# Patient Record
Sex: MALE | Race: BLACK OR AFRICAN AMERICAN | NOT HISPANIC OR LATINO | Employment: OTHER | RURAL
[De-identification: names, ages, dates, MRNs, and addresses within clinical notes are randomized per-mention and may not be internally consistent; named-entity substitution may affect disease eponyms.]

---

## 2022-09-01 ENCOUNTER — OFFICE VISIT (OUTPATIENT)
Dept: CARDIOLOGY | Facility: CLINIC | Age: 67
End: 2022-09-01
Payer: MEDICARE

## 2022-09-01 DIAGNOSIS — I10 HYPERTENSION, ESSENTIAL: Chronic | ICD-10-CM

## 2022-09-01 DIAGNOSIS — E11.9 DIABETES MELLITUS WITHOUT COMPLICATION: Chronic | ICD-10-CM

## 2022-09-01 DIAGNOSIS — G47.00 INSOMNIA, UNSPECIFIED TYPE: Chronic | ICD-10-CM

## 2022-09-01 DIAGNOSIS — R53.83 FATIGUE, UNSPECIFIED TYPE: ICD-10-CM

## 2022-09-01 DIAGNOSIS — R42 DIZZINESS: ICD-10-CM

## 2022-09-01 DIAGNOSIS — R55 SYNCOPE AND COLLAPSE: Primary | ICD-10-CM

## 2022-09-01 PROCEDURE — 99203 OFFICE O/P NEW LOW 30 MIN: CPT | Mod: S$PBB,,, | Performed by: INTERNAL MEDICINE

## 2022-09-01 PROCEDURE — 93010 ELECTROCARDIOGRAM REPORT: CPT | Mod: S$PBB,,, | Performed by: INTERNAL MEDICINE

## 2022-09-01 PROCEDURE — 93005 ELECTROCARDIOGRAM TRACING: CPT | Mod: PBBFAC | Performed by: INTERNAL MEDICINE

## 2022-09-01 PROCEDURE — 99203 PR OFFICE/OUTPT VISIT, NEW, LEVL III, 30-44 MIN: ICD-10-PCS | Mod: S$PBB,,, | Performed by: INTERNAL MEDICINE

## 2022-09-01 PROCEDURE — 99212 OFFICE O/P EST SF 10 MIN: CPT | Mod: PBBFAC | Performed by: INTERNAL MEDICINE

## 2022-09-01 PROCEDURE — 93010 EKG 12-LEAD: ICD-10-PCS | Mod: S$PBB,,, | Performed by: INTERNAL MEDICINE

## 2022-09-06 PROBLEM — R55 SYNCOPE AND COLLAPSE: Status: ACTIVE | Noted: 2022-09-06

## 2022-09-06 PROBLEM — I10 HYPERTENSION, ESSENTIAL: Chronic | Status: ACTIVE | Noted: 2022-09-06

## 2022-09-06 PROBLEM — E11.9 DIABETES MELLITUS WITHOUT COMPLICATION: Chronic | Status: ACTIVE | Noted: 2022-09-06

## 2022-09-06 PROBLEM — R53.83 FATIGUE: Status: ACTIVE | Noted: 2022-09-06

## 2022-09-06 PROBLEM — G47.00 INSOMNIA: Chronic | Status: ACTIVE | Noted: 2022-09-06

## 2022-09-06 PROBLEM — R42 DIZZINESS: Status: ACTIVE | Noted: 2022-09-06

## 2022-09-06 NOTE — PROGRESS NOTES
[facility] note        DATE OF SERVICE: 09/06/2022       PCP: Brianna Tabares MD      CHIEF COMPLAINT: No chief complaint on file.       HISTORY OF PRESENT ILLNESS:  David Snyder is a 67 y.o. male with a PMH of   Past Medical History:   Diagnosis Date    Fatigue     Syncope and collapse      who presents for No chief complaint on file.         Review of Systems: Review of Systems   Respiratory:  Positive for shortness of breath.    Cardiovascular:  Positive for leg swelling. Negative for chest pain and palpitations.   Neurological:  Positive for loss of consciousness.      PAST MEDICAL HISTORY:  Past Medical History:   Diagnosis Date    Fatigue     Syncope and collapse        PAST SURGICAL HISTORY:  No past surgical history on file.    SOCIAL HISTORY:  Social History     Socioeconomic History    Marital status:        FAMILY HISTORY:  No family history on file.      ALLERGIES:  Review of patient's allergies indicates:  Not on File     MEDICATIONS:  No current outpatient medications on file.     PHYSICAL EXAM:  There were no vitals taken for this visit.  Wt Readings from Last 3 Encounters:   No data found for Wt      There is no height or weight on file to calculate BMI.    Physical Exam  Vitals reviewed.   Constitutional:       Appearance: Normal appearance.   HENT:      Head: Normocephalic and atraumatic.   Neck:      Vascular: No carotid bruit or JVD.   Cardiovascular:      Rate and Rhythm: Normal rate and regular rhythm.      Pulses: Normal pulses.           Radial pulses are 2+ on the right side and 2+ on the left side.      Heart sounds: Normal heart sounds.   Pulmonary:      Effort: Pulmonary effort is normal.      Breath sounds: Normal breath sounds.   Musculoskeletal:      Right lower leg: No edema.      Left lower leg: No edema.   Skin:     General: Skin is warm and dry.   Neurological:      Mental Status: He is alert.     LABS REVIEWED:  LDL 76 on 6/14/22    CARDIAC STUDIES REVIEWED:EKG:  "sinus rhythm with 1st degree AV block, right axis deviation, 74 bpm        ASSESSMENT:  +insomnia with daytime somnolence.  Blackouts only last seconds.  No blackouts in a month.  "If I jump up real quick, I get dizzy." "I get tired real easy."    VISIT DIAGNOSIS:  Syncope and collapse  Comments:  with reported pauses (poor quality Holter report, copy/faxed)  Orders:  -     EKG 12-lead; Future  -     Echo; Future; Expected date: 10/01/2022  -     Holter monitor - 24 hour; Future    Insomnia, unspecified type    Diabetes mellitus without complication    Hypertension, essential    Fatigue, unspecified type    Dizziness       PLAN:  Sleep consult   Orders Placed This Encounter   Procedures    Holter monitor - 24 hour     Standing Status:   Future     Standing Expiration Date:   9/1/2023     Order Specific Question:   Release to patient     Answer:   Immediate    EKG 12-lead     Standing Status:   Future     Number of Occurrences:   1     Standing Expiration Date:   9/1/2023    Echo     Standing Status:   Future     Standing Expiration Date:   10/1/2022     Order Specific Question:   Color Doppler?     Answer:   Yes     Order Specific Question:   Release to patient     Answer:   Immediate      RTC after tests.       "

## 2022-09-13 ENCOUNTER — HOSPITAL ENCOUNTER (OUTPATIENT)
Dept: CARDIOLOGY | Facility: HOSPITAL | Age: 67
Discharge: HOME | End: 2022-09-13
Attending: INTERNAL MEDICINE
Payer: MEDICARE

## 2022-09-13 VITALS — BODY MASS INDEX: 38.51 KG/M2 | HEIGHT: 69 IN | WEIGHT: 260 LBS

## 2022-09-13 DIAGNOSIS — R55 SYNCOPE AND COLLAPSE: ICD-10-CM

## 2022-09-13 PROCEDURE — 93306 ECHO (CUPID ONLY): ICD-10-PCS | Mod: 26,,, | Performed by: INTERNAL MEDICINE

## 2022-09-13 PROCEDURE — 93306 TTE W/DOPPLER COMPLETE: CPT | Mod: 26,,, | Performed by: INTERNAL MEDICINE

## 2022-09-13 PROCEDURE — 93225 XTRNL ECG REC<48 HRS REC: CPT

## 2022-09-13 PROCEDURE — 93306 TTE W/DOPPLER COMPLETE: CPT

## 2022-09-16 LAB
AORTIC VALVE CUSP SEPERATION: 24.7 CM
AV INDEX (PROSTH): 0.63
AV MEAN GRADIENT: 9 MMHG
AV PEAK GRADIENT: 16 MMHG
AV VALVE AREA: 2.09 CM2
BSA FOR ECHO PROCEDURE: 2.4 M2
CV ECHO LV RWT: 0.66 CM
DOP CALC AO PEAK VEL: 2 M/S
DOP CALC AO VTI: 37.14 CM
DOP CALC LVOT AREA: 3.3 CM2
DOP CALC LVOT DIAMETER: 2.05 CM
DOP CALC LVOT STROKE VOLUME: 77.69 CM3
DOP CALCLVOT PEAK VEL VTI: 23.55 CM
E WAVE DECELERATION TIME: 154 MSEC
E/A RATIO: 1.33
E/E' RATIO: 8 M/S
ECHO LV POSTERIOR WALL: 1.25 CM (ref 0.6–1.1)
EJECTION FRACTION: 70 %
FRACTIONAL SHORTENING: 37 % (ref 28–44)
INTERVENTRICULAR SEPTUM: 1.24 CM (ref 0.6–1.1)
IVC DIAMETER: 1.4 CM
LEFT ATRIUM SIZE: 2.62 CM
LEFT INTERNAL DIMENSION IN SYSTOLE: 2.39 CM (ref 2.1–4)
LEFT VENTRICLE DIASTOLIC VOLUME INDEX: 26.32 ML/M2
LEFT VENTRICLE DIASTOLIC VOLUME: 60.8 ML
LEFT VENTRICLE MASS INDEX: 69 G/M2
LEFT VENTRICLE SYSTOLIC VOLUME INDEX: 8.7 ML/M2
LEFT VENTRICLE SYSTOLIC VOLUME: 20 ML
LEFT VENTRICULAR INTERNAL DIMENSION IN DIASTOLE: 3.77 CM (ref 3.5–6)
LEFT VENTRICULAR MASS: 160.12 G
LV LATERAL E/E' RATIO: 7.33 M/S
LV SEPTAL E/E' RATIO: 8.8 M/S
MV PEAK A VEL: 0.66 M/S
MV PEAK E VEL: 0.88 M/S
OHS CV EVENT MONITOR DAY: 0
OHS CV HOLTER LENGTH DECIMAL HOURS: 23.73
OHS CV HOLTER LENGTH HOURS: 23
OHS CV HOLTER LENGTH MINUTES: 44
OHS CV HOLTER SINUS AVERAGE HR: 72
OHS CV HOLTER SINUS MAX HR: 123
OHS CV HOLTER SINUS MIN HR: 39
RA WIDTH: 3.29 CM
RIGHT ATRIAL AREA: 14.9 CM2
RIGHT VENTRICULAR END-DIASTOLIC DIMENSION: 4.08 CM
RIGHT VENTRICULAR LENGTH IN DIASTOLE (APICAL 4-CHAMBER VIEW): 7.13 CM
RV MID DIAMA: 2.74 CM
TDI LATERAL: 0.12 M/S
TDI SEPTAL: 0.1 M/S
TDI: 0.11 M/S
TRICUSPID ANNULAR PLANE SYSTOLIC EXCURSION: 2.19 CM

## 2022-09-16 PROCEDURE — 93227 XTRNL ECG REC<48 HR R&I: CPT | Mod: ,,, | Performed by: INTERNAL MEDICINE

## 2022-09-16 PROCEDURE — 93227 HOLTER MONITOR - 24 HOUR (CUPID ONLY): ICD-10-PCS | Mod: ,,, | Performed by: INTERNAL MEDICINE

## 2022-09-29 RX ORDER — METFORMIN HYDROCHLORIDE 500 MG/1
500 TABLET ORAL 2 TIMES DAILY
COMMUNITY
Start: 2022-08-18

## 2022-09-29 RX ORDER — LOSARTAN POTASSIUM AND HYDROCHLOROTHIAZIDE 25; 100 MG/1; MG/1
1 TABLET ORAL DAILY
COMMUNITY
Start: 2022-06-13

## 2022-09-29 RX ORDER — PREGABALIN 150 MG/1
150 CAPSULE ORAL 2 TIMES DAILY
COMMUNITY
Start: 2022-07-15

## 2022-09-29 RX ORDER — HYDROCHLOROTHIAZIDE 25 MG/1
25 TABLET ORAL DAILY
COMMUNITY
Start: 2022-09-09

## 2022-09-29 RX ORDER — CLONIDINE HYDROCHLORIDE 0.2 MG/1
0.2 TABLET ORAL 2 TIMES DAILY
COMMUNITY
Start: 2022-06-14

## 2022-09-29 RX ORDER — CELECOXIB 200 MG/1
200 CAPSULE ORAL 2 TIMES DAILY
COMMUNITY
Start: 2022-07-18

## 2022-09-29 RX ORDER — SIMVASTATIN 40 MG/1
40 TABLET, FILM COATED ORAL NIGHTLY
COMMUNITY
Start: 2022-07-15

## 2022-09-29 RX ORDER — AMLODIPINE BESYLATE 10 MG/1
10 TABLET ORAL DAILY
COMMUNITY
Start: 2022-08-30

## 2022-09-29 RX ORDER — MECLIZINE HYDROCHLORIDE 25 MG/1
25 TABLET ORAL 2 TIMES DAILY PRN
COMMUNITY
Start: 2022-08-22

## 2022-10-11 ENCOUNTER — OFFICE VISIT (OUTPATIENT)
Dept: CARDIOLOGY | Facility: CLINIC | Age: 67
End: 2022-10-11
Payer: MEDICARE

## 2022-10-11 VITALS
HEIGHT: 69 IN | OXYGEN SATURATION: 96 % | WEIGHT: 270.38 LBS | BODY MASS INDEX: 40.05 KG/M2 | SYSTOLIC BLOOD PRESSURE: 142 MMHG | HEART RATE: 69 BPM | DIASTOLIC BLOOD PRESSURE: 82 MMHG

## 2022-10-11 DIAGNOSIS — I10 HYPERTENSION, ESSENTIAL: Primary | ICD-10-CM

## 2022-10-11 PROCEDURE — 99213 OFFICE O/P EST LOW 20 MIN: CPT | Mod: PBBFAC | Performed by: NURSE PRACTITIONER

## 2022-10-11 PROCEDURE — 99214 OFFICE O/P EST MOD 30 MIN: CPT | Mod: S$PBB,,, | Performed by: NURSE PRACTITIONER

## 2022-10-11 PROCEDURE — 99214 PR OFFICE/OUTPT VISIT, EST, LEVL IV, 30-39 MIN: ICD-10-PCS | Mod: S$PBB,,, | Performed by: NURSE PRACTITIONER

## 2022-10-11 RX ORDER — CALCIUM CARBONATE 300MG(750)
1 TABLET,CHEWABLE ORAL DAILY
COMMUNITY

## 2022-10-11 RX ORDER — DOCUSATE SODIUM 100 MG/1
100 CAPSULE, LIQUID FILLED ORAL 2 TIMES DAILY
COMMUNITY

## 2022-10-11 RX ORDER — ASCORBIC ACID 125 MG
1 TABLET,CHEWABLE ORAL DAILY
COMMUNITY

## 2022-10-11 NOTE — PROGRESS NOTES
"[facility] note        DATE OF SERVICE: 10/11/2022       PCP: Brianna Tabares MD      CHIEF COMPLAINT:   Chief Complaint   Patient presents with    Edema     At night.sometimes better when laying down    Shortness of Breath     During exertion. Better with rest        HISTORY OF PRESENT ILLNESS:  David Snyder is a 67 y.o. male with a PMH of   Past Medical History:   Diagnosis Date    Diabetes mellitus     Dizziness     Fatigue     Hypertension     Insomnia     Prostate cancer     Syncope and collapse      who presents for follow up for test results ordered by Dr. Hernandez 9/1/2022 for syncope. with reported pauses (poor quality Holter report, copy/faxed). The patient denies syncope. He states that he has been recovering from bilateral hip replacements done 9 months ago within weeks of each other. He has had complications and physical therapy was halted due to falls. He denies ever losing consciousness.  He states, "I fell 6 times. My wife saw two of them and said I wasn't picking up my feet high enough." He has had no falls or syncope since 8/2022.     Chief Complaint   Patient presents with    Edema     At night.sometimes better when laying down    Shortness of Breath     During exertion. Better with rest            PAST MEDICAL HISTORY:  Past Medical History:   Diagnosis Date    Diabetes mellitus     Dizziness     Fatigue     Hypertension     Insomnia     Prostate cancer     Syncope and collapse        PAST SURGICAL HISTORY:  Past Surgical History:   Procedure Laterality Date    FOOT SURGERY      KELOID EXCISION      x10    PROSTATECTOMY      STOMACH SURGERY      TOTAL HIP ARTHROPLASTY Bilateral     TOTAL KNEE ARTHROPLASTY Left        SOCIAL HISTORY:  Social History     Socioeconomic History    Marital status:    Tobacco Use    Smoking status: Never    Smokeless tobacco: Never   Substance and Sexual Activity    Alcohol use: Never    Drug use: Never       FAMILY HISTORY:  Family History   Problem " "Relation Age of Onset    Heart failure Mother     Stroke Father          ALLERGIES:  Review of patient's allergies indicates:  No Known Allergies     MEDICATIONS:    Current Outpatient Medications:     amLODIPine (NORVASC) 10 MG tablet, Take 10 mg by mouth once daily., Disp: , Rfl:     celecoxib (CELEBREX) 200 MG capsule, Take 200 mg by mouth 2 (two) times a day., Disp: , Rfl:     cholecalciferol, vitamin D3, (VITAMIN D3) 25 mcg (1,000 unit) Chew, Take 1 capsule by mouth once daily., Disp: , Rfl:     cloNIDine (CATAPRES) 0.2 MG tablet, Take 0.2 mg by mouth 2 (two) times a day., Disp: , Rfl:     docusate sodium (COLACE) 100 MG capsule, Take 100 mg by mouth 2 (two) times daily., Disp: , Rfl:     hydroCHLOROthiazide (HYDRODIURIL) 25 MG tablet, Take 25 mg by mouth once daily., Disp: , Rfl:     losartan-hydrochlorothiazide 100-25 mg (HYZAAR) 100-25 mg per tablet, Take 1 tablet by mouth once daily., Disp: , Rfl:     magnesium oxide 400 mg magnesium Tab, Take 1 tablet by mouth once daily., Disp: , Rfl:     meclizine (ANTIVERT) 25 mg tablet, Take 25 mg by mouth 2 (two) times daily as needed., Disp: , Rfl:     metFORMIN (GLUCOPHAGE) 500 MG tablet, Take 500 mg by mouth 2 (two) times a day., Disp: , Rfl:     pregabalin (LYRICA) 150 MG capsule, Take 150 mg by mouth 2 (two) times a day., Disp: , Rfl:     simvastatin (ZOCOR) 40 MG tablet, Take 40 mg by mouth every evening., Disp: , Rfl:   Medications have been reviewed and reconciled.     PHYSICAL EXAM:  BP (!) 142/82 (BP Location: Left arm, Patient Position: Sitting)   Pulse 69   Ht 5' 9" (1.753 m)   Wt 122.7 kg (270 lb 6.4 oz)   SpO2 96%   BMI 39.93 kg/m²   Wt Readings from Last 3 Encounters:   10/11/22 122.7 kg (270 lb 6.4 oz)   09/13/22 117.9 kg (260 lb)      Body mass index is 39.93 kg/m².    Physical Exam  Vitals and nursing note reviewed.   Constitutional:       Appearance: Normal appearance. He is obese.   HENT:      Head: Normocephalic and atraumatic.   Eyes:      " Pupils: Pupils are equal, round, and reactive to light.   Cardiovascular:      Rate and Rhythm: Normal rate and regular rhythm.      Pulses: Normal pulses.      Heart sounds: Normal heart sounds.   Pulmonary:      Effort: Pulmonary effort is normal.      Breath sounds: Normal breath sounds.   Abdominal:      General: Bowel sounds are normal.      Palpations: Abdomen is soft.   Musculoskeletal:      Cervical back: Neck supple.      Right lower leg: No edema.      Left lower leg: No edema.   Skin:     General: Skin is warm and dry.      Capillary Refill: Capillary refill takes less than 2 seconds.   Neurological:      General: No focal deficit present.      Mental Status: He is alert and oriented to person, place, and time.   Psychiatric:         Mood and Affect: Mood normal.         Behavior: Behavior normal.       LABS REVIEWED:  No results found for: WBC, RBC, HGB, HCT, MCV, MCH, MCHC, RDW, PLT, MPV, NRBC, INR  No results found for: NA, K, CL, CO2, BUN, MG, PHOS  No results found for: CPK, AST, ALT  No results found for: GLU, HGBA1C  No results found for: CHOL, HDL, LDL, TRIG, CHOLHDL    CARDIAC STUDIES REVIEWED:EK2022 RSR with 1st degree AVB; HR 74 bpm; right axis deviation; delayed r wave progression.   24  Hour Holter Monitor 2022  Basic rhythm is sinus, avg HR 72 bpm  15 PAC's  25 PVC's  No diary      echocardiogram  Results for orders placed during the hospital encounter of 22    Echo    Interpretation Summary  · The left ventricle is normal in size with mild concentric hypertrophy and normal systolic function.  · The estimated ejection fraction is 70%.  · Normal left ventricular diastolic function.  · Normal right ventricular size.  · Mild tricuspid regurgitation.  · Mild pulmonic regurgitation.              ASSESSMENT:   Patient Active Problem List   Diagnosis    Dizziness    Fatigue    Hypertension, essential    Diabetes mellitus without complication    Insomnia    Syncope and collapse             Problem List Items Addressed This Visit    None       PLAN:     RTC  6 Months or sooner if needed.

## 2023-11-02 DIAGNOSIS — Z12.11 SCREENING FOR COLON CANCER: Primary | ICD-10-CM

## 2024-03-19 DIAGNOSIS — Z12.11 COLON CANCER SCREENING: Primary | ICD-10-CM

## 2024-03-21 RX ORDER — POLYETHYLENE GLYCOL 3350, SODIUM SULFATE ANHYDROUS, SODIUM BICARBONATE, SODIUM CHLORIDE, POTASSIUM CHLORIDE 236; 22.74; 6.74; 5.86; 2.97 G/4L; G/4L; G/4L; G/4L; G/4L
4 POWDER, FOR SOLUTION ORAL ONCE
Qty: 4000 ML | Refills: 0 | Status: SHIPPED | OUTPATIENT
Start: 2024-03-21 | End: 2024-03-21

## 2024-04-25 DIAGNOSIS — Z12.11 SCREEN FOR COLON CANCER: Primary | ICD-10-CM

## 2024-04-25 RX ORDER — POLYETHYLENE GLYCOL 3350, SODIUM SULFATE ANHYDROUS, SODIUM BICARBONATE, SODIUM CHLORIDE, POTASSIUM CHLORIDE 236; 22.74; 6.74; 5.86; 2.97 G/4L; G/4L; G/4L; G/4L; G/4L
4 POWDER, FOR SOLUTION ORAL ONCE
Qty: 4000 ML | Refills: 0 | Status: SHIPPED | OUTPATIENT
Start: 2024-04-25 | End: 2024-04-25

## 2024-05-07 ENCOUNTER — HOSPITAL ENCOUNTER (OUTPATIENT)
Dept: GASTROENTEROLOGY | Facility: HOSPITAL | Age: 69
Discharge: HOME | End: 2024-05-07
Attending: INTERNAL MEDICINE
Payer: MEDICARE

## 2024-05-07 DIAGNOSIS — Z12.11 SCREENING FOR COLON CANCER: ICD-10-CM

## 2024-05-07 DIAGNOSIS — Z12.11 SCREENING FOR COLON CANCER: Primary | ICD-10-CM

## 2024-07-17 DIAGNOSIS — Z12.11 SCREEN FOR COLON CANCER: Primary | ICD-10-CM

## 2024-07-17 RX ORDER — POLYETHYLENE GLYCOL 3350, SODIUM SULFATE ANHYDROUS, SODIUM BICARBONATE, SODIUM CHLORIDE, POTASSIUM CHLORIDE 236; 22.74; 6.74; 5.86; 2.97 G/4L; G/4L; G/4L; G/4L; G/4L
4 POWDER, FOR SOLUTION ORAL ONCE
Qty: 4000 ML | Refills: 0 | Status: SHIPPED | OUTPATIENT
Start: 2024-07-17 | End: 2024-07-17

## 2024-07-19 ENCOUNTER — ANESTHESIA (OUTPATIENT)
Dept: GASTROENTEROLOGY | Facility: HOSPITAL | Age: 69
End: 2024-07-19
Payer: MEDICARE

## 2024-07-19 ENCOUNTER — ANESTHESIA EVENT (OUTPATIENT)
Dept: GASTROENTEROLOGY | Facility: HOSPITAL | Age: 69
End: 2024-07-19
Payer: MEDICARE

## 2024-07-19 ENCOUNTER — HOSPITAL ENCOUNTER (OUTPATIENT)
Dept: GASTROENTEROLOGY | Facility: HOSPITAL | Age: 69
Discharge: HOME | End: 2024-07-19
Attending: INTERNAL MEDICINE | Admitting: INTERNAL MEDICINE
Payer: MEDICARE

## 2024-07-19 VITALS
HEIGHT: 69 IN | OXYGEN SATURATION: 95 % | WEIGHT: 280 LBS | SYSTOLIC BLOOD PRESSURE: 126 MMHG | BODY MASS INDEX: 41.47 KG/M2 | DIASTOLIC BLOOD PRESSURE: 66 MMHG | RESPIRATION RATE: 22 BRPM | TEMPERATURE: 98 F | HEART RATE: 48 BPM

## 2024-07-19 DIAGNOSIS — Z12.11 SCREENING FOR COLON CANCER: ICD-10-CM

## 2024-07-19 LAB — GLUCOSE SERPL-MCNC: 140 MG/DL (ref 70–105)

## 2024-07-19 PROCEDURE — 88305 TISSUE EXAM BY PATHOLOGIST: CPT | Mod: TC,91,SUR | Performed by: INTERNAL MEDICINE

## 2024-07-19 PROCEDURE — 37000009 HC ANESTHESIA EA ADD 15 MINS

## 2024-07-19 PROCEDURE — 27201423 OPTIME MED/SURG SUP & DEVICES STERILE SUPPLY

## 2024-07-19 PROCEDURE — 37000008 HC ANESTHESIA 1ST 15 MINUTES

## 2024-07-19 PROCEDURE — 45380 COLONOSCOPY AND BIOPSY: CPT | Mod: 59,PT | Performed by: INTERNAL MEDICINE

## 2024-07-19 PROCEDURE — 82962 GLUCOSE BLOOD TEST: CPT

## 2024-07-19 PROCEDURE — 45385 COLONOSCOPY W/LESION REMOVAL: CPT | Mod: PT,,, | Performed by: INTERNAL MEDICINE

## 2024-07-19 PROCEDURE — 45380 COLONOSCOPY AND BIOPSY: CPT | Mod: 59,PT,, | Performed by: INTERNAL MEDICINE

## 2024-07-19 PROCEDURE — 63600175 PHARM REV CODE 636 W HCPCS: Performed by: NURSE ANESTHETIST, CERTIFIED REGISTERED

## 2024-07-19 PROCEDURE — 45385 COLONOSCOPY W/LESION REMOVAL: CPT | Mod: PT | Performed by: INTERNAL MEDICINE

## 2024-07-19 PROCEDURE — 25000003 PHARM REV CODE 250: Performed by: NURSE ANESTHETIST, CERTIFIED REGISTERED

## 2024-07-19 RX ORDER — SODIUM CHLORIDE 0.9 % (FLUSH) 0.9 %
10 SYRINGE (ML) INJECTION
Status: DISCONTINUED | OUTPATIENT
Start: 2024-07-19 | End: 2024-07-20 | Stop reason: HOSPADM

## 2024-07-19 RX ORDER — PROPOFOL 10 MG/ML
VIAL (ML) INTRAVENOUS
Status: DISCONTINUED | OUTPATIENT
Start: 2024-07-19 | End: 2024-07-19

## 2024-07-19 RX ORDER — LIDOCAINE HYDROCHLORIDE 20 MG/ML
INJECTION, SOLUTION EPIDURAL; INFILTRATION; INTRACAUDAL; PERINEURAL
Status: DISCONTINUED | OUTPATIENT
Start: 2024-07-19 | End: 2024-07-19

## 2024-07-19 RX ADMIN — LIDOCAINE HYDROCHLORIDE 100 MG: 20 INJECTION, SOLUTION INTRAVENOUS at 10:07

## 2024-07-19 RX ADMIN — PROPOFOL 50 MG: 10 INJECTION, EMULSION INTRAVENOUS at 10:07

## 2024-07-19 RX ADMIN — SODIUM CHLORIDE: 9 INJECTION, SOLUTION INTRAVENOUS at 10:07

## 2024-07-19 RX ADMIN — PROPOFOL 100 MG: 10 INJECTION, EMULSION INTRAVENOUS at 10:07

## 2024-07-19 NOTE — H&P
Gastroenterology Pre-procedure H&P    History of Present Illness    David Snyder is a 69 y.o. male that  has a past medical history of Diabetes mellitus, Dizziness, Fatigue, Hypertension, Insomnia, Prostate cancer, and Syncope and collapse.     Patient here for routine screening     Patient denies wt loss, abdominal pain, diarrhea, melena/hematochezia, change in stool caliber, no anticoagulants, FMHx of GI related malignancies.      Past Medical History:   Diagnosis Date    Diabetes mellitus     Dizziness     Fatigue     Hypertension     Insomnia     Prostate cancer     Syncope and collapse        Past Surgical History:   Procedure Laterality Date    FOOT SURGERY      KELOID EXCISION      x10    PROSTATECTOMY      STOMACH SURGERY      TOTAL HIP ARTHROPLASTY Bilateral     TOTAL KNEE ARTHROPLASTY Left        Family History   Problem Relation Name Age of Onset    Heart failure Mother      Stroke Father         Social History     Socioeconomic History    Marital status:    Tobacco Use    Smoking status: Never    Smokeless tobacco: Never   Substance and Sexual Activity    Alcohol use: Never    Drug use: Never       Current Outpatient Medications   Medication Sig Dispense Refill    amLODIPine (NORVASC) 10 MG tablet Take 10 mg by mouth once daily.      celecoxib (CELEBREX) 200 MG capsule Take 200 mg by mouth 2 (two) times a day.      cholecalciferol, vitamin D3, (VITAMIN D3) 25 mcg (1,000 unit) Chew Take 1 capsule by mouth once daily.      cloNIDine (CATAPRES) 0.2 MG tablet Take 0.2 mg by mouth 2 (two) times a day.      docusate sodium (COLACE) 100 MG capsule Take 100 mg by mouth 2 (two) times daily.      hydroCHLOROthiazide (HYDRODIURIL) 25 MG tablet Take 25 mg by mouth once daily.      losartan-hydrochlorothiazide 100-25 mg (HYZAAR) 100-25 mg per tablet Take 1 tablet by mouth once daily.      magnesium oxide 400 mg magnesium Tab Take 1 tablet by mouth once daily.      meclizine (ANTIVERT) 25 mg tablet Take 25 mg by  mouth 2 (two) times daily as needed.      metFORMIN (GLUCOPHAGE) 500 MG tablet Take 500 mg by mouth 2 (two) times a day.      pregabalin (LYRICA) 150 MG capsule Take 150 mg by mouth 2 (two) times a day.      simvastatin (ZOCOR) 40 MG tablet Take 40 mg by mouth every evening.       No current facility-administered medications for this encounter.       Review of patient's allergies indicates:  No Known Allergies    Objective:  There were no vitals filed for this visit.     GEN: normal appearing, NAD, AAO x3  HENT: NCAT, anicteric, OP benign  CV: normal rate, regular rhythm  RESP: NABS, symmetric rise, unlabored  ABD: soft, ND, no guarding or TTP  SKIN: warm and dry  NEURO: grossly afocal    Assessment and Plan:    Proceed with:    Colonoscopy for screening for colon cancer    Nicolás Rossi MD  Gastroenterology

## 2024-07-19 NOTE — ANESTHESIA PREPROCEDURE EVALUATION
07/19/2024  David Snyder is a 69 y.o., male.      Pre-op Assessment    I have reviewed the Patient Summary Reports.    I have reviewed the NPO Status.   I have reviewed the Medications.     Review of Systems  Anesthesia Hx:  No problems with previous Anesthesia   History of prior surgery of interest to airway management or planning:          Denies Family Hx of Anesthesia complications.    Denies Personal Hx of Anesthesia complications.                    Social:  Non-Smoker, No Alcohol Use       Cardiovascular:     Hypertension           hyperlipidemia                             Endocrine:  Diabetes, type 2               Physical Exam  General: Well nourished    Airway:  Mallampati: II   Mouth Opening: Normal  TM Distance: Normal  Tongue: Normal  Neck ROM: Normal ROM    Dental:  Intact        Anesthesia Plan  Type of Anesthesia, risks & benefits discussed:    Anesthesia Type: Gen Natural Airway  Intra-op Monitoring Plan: Standard ASA Monitors  Post Op Pain Control Plan: multimodal analgesia  Induction:  IV  Informed Consent: Informed consent signed with the Patient and all parties understand the risks and agree with anesthesia plan.  All questions answered. Patient consented to blood products? Yes  ASA Score: 3  Day of Surgery Review of History & Physical: H&P Update referred to the surgeon/provider.I have interviewed and examined the patient. I have reviewed the patient's H&P dated: There are no significant changes.     Ready For Surgery From Anesthesia Perspective.     .    Past Medical History:   Diagnosis Date    Diabetes mellitus     Dizziness     Fatigue     Hypertension     Insomnia     Prostate cancer     Syncope and collapse        Past Surgical History:   Procedure Laterality Date    FOOT SURGERY      KELOID EXCISION      x10    PROSTATECTOMY      STOMACH SURGERY      TOTAL HIP ARTHROPLASTY Bilateral      TOTAL KNEE ARTHROPLASTY Left        Family History   Problem Relation Name Age of Onset    Heart failure Mother      Stroke Father         Social History     Socioeconomic History    Marital status:    Tobacco Use    Smoking status: Never    Smokeless tobacco: Never   Substance and Sexual Activity    Alcohol use: Never    Drug use: Never       Current Outpatient Medications   Medication Sig Dispense Refill    amLODIPine (NORVASC) 10 MG tablet Take 10 mg by mouth once daily.      celecoxib (CELEBREX) 200 MG capsule Take 200 mg by mouth 2 (two) times a day.      cholecalciferol, vitamin D3, (VITAMIN D3) 25 mcg (1,000 unit) Chew Take 1 capsule by mouth once daily.      cloNIDine (CATAPRES) 0.2 MG tablet Take 0.2 mg by mouth 2 (two) times a day.      docusate sodium (COLACE) 100 MG capsule Take 100 mg by mouth 2 (two) times daily.      hydroCHLOROthiazide (HYDRODIURIL) 25 MG tablet Take 25 mg by mouth once daily.      losartan-hydrochlorothiazide 100-25 mg (HYZAAR) 100-25 mg per tablet Take 1 tablet by mouth once daily.      magnesium oxide 400 mg magnesium Tab Take 1 tablet by mouth once daily.      metFORMIN (GLUCOPHAGE) 500 MG tablet Take 500 mg by mouth 2 (two) times a day.      pregabalin (LYRICA) 150 MG capsule Take 150 mg by mouth 2 (two) times a day.      simvastatin (ZOCOR) 40 MG tablet Take 40 mg by mouth every evening.      meclizine (ANTIVERT) 25 mg tablet Take 25 mg by mouth 2 (two) times daily as needed.       Current Facility-Administered Medications   Medication Dose Route Frequency Provider Last Rate Last Admin    sodium chloride 0.9% flush 10 mL  10 mL Intravenous PRN Nicolás Rossi MD           Review of patient's allergies indicates:  No Known Allergies     Outpatient Medications as of 7/19/2024   Medication Sig Dispense Refill    amLODIPine (NORVASC) 10 MG tablet Take 10 mg by mouth once daily.      celecoxib (CELEBREX) 200 MG capsule Take 200 mg by mouth 2 (two) times a day.       "cholecalciferol, vitamin D3, (VITAMIN D3) 25 mcg (1,000 unit) Chew Take 1 capsule by mouth once daily.      cloNIDine (CATAPRES) 0.2 MG tablet Take 0.2 mg by mouth 2 (two) times a day.      docusate sodium (COLACE) 100 MG capsule Take 100 mg by mouth 2 (two) times daily.      hydroCHLOROthiazide (HYDRODIURIL) 25 MG tablet Take 25 mg by mouth once daily.      losartan-hydrochlorothiazide 100-25 mg (HYZAAR) 100-25 mg per tablet Take 1 tablet by mouth once daily.      magnesium oxide 400 mg magnesium Tab Take 1 tablet by mouth once daily.      metFORMIN (GLUCOPHAGE) 500 MG tablet Take 500 mg by mouth 2 (two) times a day.      pregabalin (LYRICA) 150 MG capsule Take 150 mg by mouth 2 (two) times a day.      simvastatin (ZOCOR) 40 MG tablet Take 40 mg by mouth every evening.      meclizine (ANTIVERT) 25 mg tablet Take 25 mg by mouth 2 (two) times daily as needed.       Facility-Administered Medications as of 7/19/2024   Medication Dose Route Frequency Provider Last Rate Last Admin    sodium chloride 0.9% flush 10 mL  10 mL Intravenous PRN Nicolás Rossi MD            Chemistry    No results found for: "NA", "K", "CL", "CO2", "BUN", "CREATININE", "GLU" No results found for: "CALCIUM", "ALKPHOS", "AST", "ALT", "BILITOT", "ESTGFRAFRICA", "EGFRNONAA"     No results found for: "WBC", "HGB", "HCT", "PLT"  Results for orders placed or performed in visit on 09/01/22   EKG 12-lead    Collection Time: 09/01/22  1:37 PM    Narrative    Test Reason : R55,    Vent. Rate : 074 BPM     Atrial Rate : 074 BPM     P-R Int : 222 ms          QRS Dur : 108 ms      QT Int : 386 ms       P-R-T Axes : 046 130 033 degrees     QTc Int : 428 ms    Sinus rhythm with 1st degree A-V block  Right axis deviation  Cannot rule out Anterior infarct ,age undetermined  Abnormal ECG  No previous ECGs available  Confirmed by Minesh Hernandez MD ([id number]) on 9/2/2022 3:34:39 PM    Referred By:             Confirmed By:Minesh Hernandez MD      "

## 2024-07-19 NOTE — DISCHARGE INSTRUCTIONS
Procedure Date  7/19/24     Impression  Overall Impression:   2 subcentimeter polyps were removed  The ileocecal valve, transverse colon, descending colon and sigmoid colon appeared normal.  (grade 2) hemorrhoids        Recommendation  Await pathology results   Repeat colonoscopy in 7 years         Outcome of procedure: successful Colonoscopy  Disposition: patient to recovery following procedure; discharge to home when appropriate parameters met  Provisions for follow up: please call my office for any unexpected symptoms like chest or abdominal pain or bleeding following your procedure.  Final Diagnosis: colon polyps     THE NURSE WILL CALL YOU WITH YOUR BIOPSY RESULTS IN A FEW DAYS. IF YOU HAVE  [facility] MYCHART YOUR RESULTS WILL APPEAR THERE AS WELL.  NO DRIVING, OPERATING EQUIPMENT, OR SIGNING LEGAL DOCUMENTS FOR 24 HOURS.

## 2024-07-19 NOTE — TRANSFER OF CARE
"Anesthesia Transfer of Care Note    Patient: David Snyder    Procedure(s) Performed: Colonoscopy     Patient location: GI    Anesthesia Type: general    Transport from OR: Transported from OR on room air with adequate spontaneous ventilation. Continuous ECG monitoring in transport. Continuous SpO2 monitoring in transport    Post pain: adequate analgesia    Post assessment: no apparent anesthetic complications    Post vital signs: stable    Level of consciousness: responds to stimulation, awake and sedated    Nausea/Vomiting: no nausea/vomiting    Complications: none    Transfer of care protocol was followed      Last vitals: Visit Vitals  BP (!) 92/44   Pulse (!) 49   Temp 36.9 °C (98.4 °F)   Resp 12   Ht 5' 9" (1.753 m)   Wt 127 kg (279 lb 15.8 oz)   SpO2 97%   BMI 41.35 kg/m²     "

## 2024-07-19 NOTE — ANESTHESIA POSTPROCEDURE EVALUATION
Anesthesia Post Evaluation    Patient: David Snyder    Procedure(s) Performed: Colonoscopy     Final Anesthesia Type: general      Patient location during evaluation: GI PACU  Patient participation: Yes- Able to Participate  Level of consciousness: awake and alert  Post-procedure vital signs: reviewed and stable  Pain management: adequate  Airway patency: patent  SUSAN mitigation strategies: Multimodal analgesia  PONV status at discharge: No PONV  Anesthetic complications: no      Cardiovascular status: hemodynamically stable and blood pressure returned to baseline  Respiratory status: spontaneous ventilation and room air  Hydration status: euvolemic  Follow-up not needed.  Comments: Refer to nursing note for pain/sonia score upon discharge              Vitals Value Taken Time   BP 94/40 07/19/24 1102   Temp 36.9 °C (98.4 °F) 07/19/24 1056   Pulse 44 07/19/24 1103   Resp 21 07/19/24 1103   SpO2 97 % 07/19/24 1103   Vitals shown include unfiled device data.      No case tracking events are documented in the log.      Pain/Sonia Score: Sonia Score: 8 (7/19/2024 10:59 AM)

## 2024-07-22 LAB
DHEA SERPL-MCNC: NORMAL
ESTROGEN SERPL-MCNC: NORMAL PG/ML
INSULIN SERPL-ACNC: NORMAL U[IU]/ML
LAB AP GROSS DESCRIPTION: NORMAL
LAB AP LABORATORY NOTES: NORMAL
T3RU NFR SERPL: NORMAL %

## 2024-07-25 NOTE — PROGRESS NOTES
Mary, please call the patient and let him know that the biopsies showed small non-cancerous polyps but were otherwise normal. We recommend repeat colonoscopy in 7 years. Thank you!

## 2024-08-16 ENCOUNTER — TELEPHONE (OUTPATIENT)
Dept: GASTROENTEROLOGY | Facility: CLINIC | Age: 69
End: 2024-08-16
Payer: MEDICARE

## 2024-08-16 NOTE — TELEPHONE ENCOUNTER
----- Message from Nicolás Rossi MD sent at 7/25/2024  9:37 AM CDT -----  Mary, please call the patient and let him know that the biopsies showed small non-cancerous polyps but were otherwise normal. We recommend repeat colonoscopy in 7 years. Thank you!